# Patient Record
(demographics unavailable — no encounter records)

---

## 2025-04-18 NOTE — PLAN
[FreeTextEntry1] : Based on child's weight child should be receiving 11 mL of Tylenol and/or Motrin every 6 hours Explained to mom starting antibiotics at this time without being able to evaluate child in person would most likely not be beneficial as she will not be able to get the antibiotics to the a.m. and child's pediatrician/Dr. Radha Buitrago will be in later in the day Mom agreeable to give child dose of Motrin 11 cc now and follow-up with pediatrician in the a.m.-she understands she may call back or take child to the ER if his symptoms worsen Instructions and precautions reviewed

## 2025-04-18 NOTE — ASSESSMENT
[FreeTextEntry1] : On virtual exam child awake alert appears uncomfortable holding his left ear at times, no respiratory distress or tachypnea no stridor Impression: Upper respiratory infection left ear pain

## 2025-04-18 NOTE — HISTORY OF PRESENT ILLNESS
[Mother] : mother [Home] : at home, [unfilled] , at the time of the visit. [Other Location: e.g. Home (Enter Location, City,State)___] : at [unfilled] [Telehealth (audio & video)] : This visit was provided via telehealth using real-time 2-way audio visual technology. [FreeTextEntry3] : Patient's mother Kirstin Moore [FreeTextEntry8] : 5-year-old male with mom (Kirstin Moore) with no significant past medical history, no medications immunizations up-to-date and no known allergies woke from sleep complaining of tongue and left ear pain. Mom states child has had increased cough with some congestion times last 1 week with no reported fever however after child woke up she took his temperature which was 99.6 and gave him 5 mL of children's Tylenol. Child weighs approximately 50 pounds   On virtual exam child awake alert appears uncomfortable holding his left ear at times, no respiratory distress or tachypnea no stridor Impression: Upper respiratory infection left ear pain